# Patient Record
(demographics unavailable — no encounter records)

---

## 2018-01-10 NOTE — PCM.OPNOTE
- General Post-Op/Procedure Note


Date of Surgery/Procedure: 01/10/18


Condition: Good


Free Text/Narrative:: 





Diagnosis: Snoring, sleep apnea, mouth breathing, Foreign body Right ear


Procedure: Removal of foreign body Right ear under microscope, BIlateral 

tonsillectomy, adenoidectomy


Surgeon: Kirsty Wild MD


Anesthesia: GA


Anesthesiologist: Sarah FRANKEL


Date of procedure: 1/10/2018


Indications: Snoring, sleep apnea, mouth breathing, Foreign body Right ear ( 

per Mom child likely inserted a piece of paper in her Right ear 2 years ago; 

this was visualized in the office however the child was un co operative for 

removal)


Findings: Right ear - paper with overlying cerumen; ruddy Gr 3 tonsils; infected 

adenoid pad blocking 50% of airway


Operation Details: 


An informed consent was obtained. A time out was performed and the patient was 

brought back to the operating room. General anesthesia was administered with an 

endotracheal tube. 


A microscope was brought into place and the right ear canal was visulaised - 

findings as above; the foreign body was removed with a curette. Underlying TM 

and ME -normal.


The table was turned 90 away from the anesthesia cart.  Patient was 

appropriately positioned on the operating table. An appropriately sized Estela 

Rad mouth gag was positioned and suspended with a Dale stand. The right 

tonsil was grasped with a Eric Brown tonsil holding forceps and removed with 

a tonsil snare. The tonsillar fossa was packed with an Afrin soaked 2 x 2 

gauze. The left tonsil was then similarly dissected out with the snare and 

packed with an Afrin soaked 2 x 2 gauze. Hemostasis was achieved bilaterally 

with the bipolar cautery at a setting of 10 W. Bilateral fossae were irrigated 

with warm saline and hemostasis was ensured. Bilaterally tonsillar pillars were 

sutured at the inferior pole with a 2-0 Vicryl suture. 


The palate was palpated and there was no evidence of a submucous cleft palate. 

Red rubber Coviden 10 Hebrew catheter was inserted through the nasal cavity and 

brought back out of the nasopharynx to retract the soft palate away from the 

nasopharyngeal wall. The post nasal space was inspected-findings as above. A 

suction cautery was used at a setting of 25 Coagulation 1 cutting and the 

adenoid tissue was removed. Postnasal space was then packed with a 2 x 2 gauze 

soaked in oxymetazoline 0.05%. It was removed and hemostasis was and ensured


The postnasal space was suctioned clear. This concluded the procedure. Mouth 

gag was removed the oral cavity was inspected. Lips gums and teeth were intact. 

Lubricating jelly was applied to the lips. The patient was turned over to the 

anesthesiologist for recovery.


Specimens: Ruddy Tonsils


IV fluids: 400 ml


Blood loss :15 ml


Blood products: nil


Disposition: PACU for recovery


Follow up: PRN.

## 2018-01-10 NOTE — PCM.PREANE
Preanesthetic Assessment





- Anesthesia/Transfusion/Family Hx


Anesthesia History: No Prior Anesthesia


Family History of Anesthesia Reaction: No


Transfusion History: No Prior Transfusion(s)





- Review of Systems


General: No Symptoms


Pulmonary: No Symptoms


Cardiovascular: No Symptoms


Gastrointestinal: No Symptoms


Neurological: No Symptoms


Other: Reports: None





- Physical Assessment


NPO Status Date: 01/09/18


O2 Sat by Pulse Oximetry: 100


Respiratory Rate: 20


Vital Signs: 





 Last Vital Signs











Temp  36.8 C   01/10/18 08:14


 


Pulse  88   01/10/18 08:14


 


Resp  20 L  01/10/18 08:14


 


BP  129/73 H  01/10/18 08:14


 


Pulse Ox  100   01/10/18 08:14











Height: 1.14 m


Weight: 23.587 kg


ASA Class: 2


Mental Status: Alert & Oriented x3


Airway Class: Mallampati = 2


Dentition: Reports: Normal Dentition


ROM/Head Extension: Full


Lungs: Clear to Auscultation, Normal Respiratory Effort


Cardiovascular: Regular Rate, Regular Rhythm





- Allergies


Allergies/Adverse Reactions: 


 Allergies











Allergy/AdvReac Type Severity Reaction Status Date / Time


 


shellfish derived Allergy  Facial Verified 01/08/18 12:12





   Swelling  














- Acknowledgements


Anesthesia Type Planned: General Anesthesia


Pt an Appropriate Candidate for the Planned Anesthesia: Yes


Alternatives and Risks of Anesthesia Discussed w Pt/Guardian: Yes


Pt/Guardian Understands and Agrees with Anesthesia Plan: Yes





PreAnesthesia Questionnaire





- Past Health History


Medical/Surgical History: Denies Medical/Surgical History


HEENT History: Reports: Allergic Rhinitis, Other (See Below)


Other HEENT History: frequent tonsillitis





- Infectious Disease History


Infectious Disease History: Reports: None





- SUBSTANCE USE


Smoking Status *Q: Never Smoker


Second Hand Smoke Exposure: No


Days Per Week of Alcohol Use: 0


Recreational Drug Use History: No





- HOME MEDS


Home Medications: 


 Home Meds





. [No Known Home Meds]  08/06/14 [History]











- CURRENT (IN HOUSE) MEDS


Current Meds: 





 Current Medications








Discontinued Medications





Atropine Sulfate (Atropine) Confirm Administered Dose 0.4 mg .ROUTE .STK-MED ONE


   Stop: 01/10/18 08:24


Dexamethasone (Dexamethasone) Confirm Administered Dose 20 mg .ROUTE .STK-MED 

ONE


   Stop: 01/10/18 08:24


Epinephrine HCl (Adrenalin) Confirm Administered Dose 1 mg .ROUTE .STK-MED ONE


   Stop: 01/10/18 07:48


Fentanyl (Sublimaze) Confirm Administered Dose 100 mcg .ROUTE .STK-MED ONE


   Stop: 01/10/18 08:24


Ondansetron HCl (Zofran) Confirm Administered Dose 8 mg .ROUTE .STK-MED ONE


   Stop: 01/10/18 08:24


Oxymetazoline HCl (Afrin Original 0.05% Nasal Spray) Confirm Administered Dose 

15 ml .ROUTE .STK-MED ONE


   Stop: 01/10/18 07:48


Propofol (Diprivan  20 Ml) Confirm Administered Dose 200 mg .ROUTE .STK-MED ONE


   Stop: 01/10/18 08:24


Succinylcholine Chloride (Succinylcholine In Ns Pf) Confirm Administered Dose 

200 mg .ROUTE .STK-MED ONE


   Stop: 01/10/18 08:24

## 2018-01-10 NOTE — PCM.HPR
H & P Addendum review





- H & P Addendum Review


Date of Original H & P: 12/19/17


Date Reviewed: 01/10/18


Time Reviewed: 08:20


Patient was Examined: No Changes